# Patient Record
Sex: FEMALE | Race: OTHER | Employment: OTHER | URBAN - METROPOLITAN AREA
[De-identification: names, ages, dates, MRNs, and addresses within clinical notes are randomized per-mention and may not be internally consistent; named-entity substitution may affect disease eponyms.]

---

## 2019-04-04 LAB
ANTIBODY SCREEN, EXTERNAL: NEGATIVE
HBSAG, EXTERNAL: NEGATIVE
HCT, EXTERNAL: 37.1
HGB, EXTERNAL: 11.7
HIV, EXTERNAL: NEGATIVE
RPR, EXTERNAL: NONREACTIVE
RUBELLA, EXTERNAL: NORMAL
TYPE, ABO & RH, EXTERNAL: NORMAL

## 2019-04-11 LAB
CHLAMYDIA, EXTERNAL: NEGATIVE
N. GONORRHEA, EXTERNAL: NEGATIVE

## 2019-08-30 LAB — GTT, 1 HR, GLUCOLA, EXTERNAL: 118

## 2019-10-17 LAB — GRBS, EXTERNAL: POSITIVE

## 2019-10-24 ENCOUNTER — HOSPITAL ENCOUNTER (INPATIENT)
Age: 24
LOS: 2 days | Discharge: HOME | End: 2019-10-26
Attending: OBSTETRICS & GYNECOLOGY | Admitting: OBSTETRICS & GYNECOLOGY
Payer: OTHER GOVERNMENT

## 2019-10-24 ENCOUNTER — ANESTHESIA EVENT (OUTPATIENT)
Dept: LABOR AND DELIVERY | Age: 24
End: 2019-10-24
Payer: OTHER GOVERNMENT

## 2019-10-24 ENCOUNTER — ANESTHESIA (OUTPATIENT)
Dept: LABOR AND DELIVERY | Age: 24
End: 2019-10-24
Payer: OTHER GOVERNMENT

## 2019-10-24 PROBLEM — Z34.90 PREGNANCY: Status: ACTIVE | Noted: 2019-10-24

## 2019-10-24 LAB
ERYTHROCYTE [DISTWIDTH] IN BLOOD BY AUTOMATED COUNT: 16.9 % (ref 11.5–14.5)
HCT VFR BLD AUTO: 37.5 % (ref 35–47)
HGB BLD-MCNC: 11.8 G/DL (ref 11.5–16)
MCH RBC QN AUTO: 25.9 PG (ref 26–34)
MCHC RBC AUTO-ENTMCNC: 31.5 G/DL (ref 30–36.5)
MCV RBC AUTO: 82.4 FL (ref 80–99)
NRBC # BLD: 0 K/UL (ref 0–0.01)
NRBC BLD-RTO: 0 PER 100 WBC
PLATELET # BLD AUTO: 280 K/UL (ref 150–400)
PMV BLD AUTO: 9.8 FL (ref 8.9–12.9)
RBC # BLD AUTO: 4.55 M/UL (ref 3.8–5.2)
WBC # BLD AUTO: 11.8 K/UL (ref 3.6–11)

## 2019-10-24 PROCEDURE — 75410000003 HC RECOV DEL/VAG/CSECN EA 0.5 HR: Performed by: OBSTETRICS & GYNECOLOGY

## 2019-10-24 PROCEDURE — 75410000000 HC DELIVERY VAGINAL/SINGLE: Performed by: OBSTETRICS & GYNECOLOGY

## 2019-10-24 PROCEDURE — 10907ZC DRAINAGE OF AMNIOTIC FLUID, THERAPEUTIC FROM PRODUCTS OF CONCEPTION, VIA NATURAL OR ARTIFICIAL OPENING: ICD-10-PCS | Performed by: OBSTETRICS & GYNECOLOGY

## 2019-10-24 PROCEDURE — 74011000250 HC RX REV CODE- 250: Performed by: STUDENT IN AN ORGANIZED HEALTH CARE EDUCATION/TRAINING PROGRAM

## 2019-10-24 PROCEDURE — 75410000002 HC LABOR FEE PER 1 HR: Performed by: OBSTETRICS & GYNECOLOGY

## 2019-10-24 PROCEDURE — 36415 COLL VENOUS BLD VENIPUNCTURE: CPT

## 2019-10-24 PROCEDURE — 76060000078 HC EPIDURAL ANESTHESIA: Performed by: STUDENT IN AN ORGANIZED HEALTH CARE EDUCATION/TRAINING PROGRAM

## 2019-10-24 PROCEDURE — 74011000258 HC RX REV CODE- 258: Performed by: OBSTETRICS & GYNECOLOGY

## 2019-10-24 PROCEDURE — 77030011943

## 2019-10-24 PROCEDURE — 85027 COMPLETE CBC AUTOMATED: CPT

## 2019-10-24 PROCEDURE — 0HQ9XZZ REPAIR PERINEUM SKIN, EXTERNAL APPROACH: ICD-10-PCS | Performed by: OBSTETRICS & GYNECOLOGY

## 2019-10-24 PROCEDURE — 74011250636 HC RX REV CODE- 250/636: Performed by: STUDENT IN AN ORGANIZED HEALTH CARE EDUCATION/TRAINING PROGRAM

## 2019-10-24 PROCEDURE — 74011250637 HC RX REV CODE- 250/637: Performed by: OBSTETRICS & GYNECOLOGY

## 2019-10-24 PROCEDURE — 65270000029 HC RM PRIVATE

## 2019-10-24 PROCEDURE — 4A1HXCZ MONITORING OF PRODUCTS OF CONCEPTION, CARDIAC RATE, EXTERNAL APPROACH: ICD-10-PCS | Performed by: OBSTETRICS & GYNECOLOGY

## 2019-10-24 PROCEDURE — 74011250636 HC RX REV CODE- 250/636: Performed by: OBSTETRICS & GYNECOLOGY

## 2019-10-24 PROCEDURE — 00HU33Z INSERTION OF INFUSION DEVICE INTO SPINAL CANAL, PERCUTANEOUS APPROACH: ICD-10-PCS | Performed by: STUDENT IN AN ORGANIZED HEALTH CARE EDUCATION/TRAINING PROGRAM

## 2019-10-24 RX ORDER — OXYTOCIN/0.9 % SODIUM CHLORIDE 20/1000 ML
125-500 PLASTIC BAG, INJECTION (ML) INTRAVENOUS ONCE
Status: COMPLETED | OUTPATIENT
Start: 2019-10-24 | End: 2019-10-24

## 2019-10-24 RX ORDER — DIPHENHYDRAMINE HCL 25 MG
25 CAPSULE ORAL
Status: DISCONTINUED | OUTPATIENT
Start: 2019-10-24 | End: 2019-10-26 | Stop reason: HOSPADM

## 2019-10-24 RX ORDER — DOCUSATE SODIUM 100 MG/1
100 CAPSULE, LIQUID FILLED ORAL
Status: DISCONTINUED | OUTPATIENT
Start: 2019-10-24 | End: 2019-10-26 | Stop reason: HOSPADM

## 2019-10-24 RX ORDER — IBUPROFEN 800 MG/1
800 TABLET ORAL EVERY 8 HOURS
Status: DISCONTINUED | OUTPATIENT
Start: 2019-10-24 | End: 2019-10-26 | Stop reason: HOSPADM

## 2019-10-24 RX ORDER — OXYTOCIN/0.9 % SODIUM CHLORIDE 30/500 ML
0-20 PLASTIC BAG, INJECTION (ML) INTRAVENOUS
Status: DISCONTINUED | OUTPATIENT
Start: 2019-10-24 | End: 2019-10-24

## 2019-10-24 RX ORDER — ONDANSETRON 2 MG/ML
4 INJECTION INTRAMUSCULAR; INTRAVENOUS
Status: DISCONTINUED | OUTPATIENT
Start: 2019-10-24 | End: 2019-10-24

## 2019-10-24 RX ORDER — SODIUM CHLORIDE 0.9 % (FLUSH) 0.9 %
5-40 SYRINGE (ML) INJECTION EVERY 8 HOURS
Status: DISCONTINUED | OUTPATIENT
Start: 2019-10-24 | End: 2019-10-25

## 2019-10-24 RX ORDER — NALOXONE HYDROCHLORIDE 0.4 MG/ML
0.4 INJECTION, SOLUTION INTRAMUSCULAR; INTRAVENOUS; SUBCUTANEOUS AS NEEDED
Status: DISCONTINUED | OUTPATIENT
Start: 2019-10-24 | End: 2019-10-26 | Stop reason: HOSPADM

## 2019-10-24 RX ORDER — HYDROMORPHONE HYDROCHLORIDE 2 MG/ML
1 INJECTION, SOLUTION INTRAMUSCULAR; INTRAVENOUS; SUBCUTANEOUS
Status: DISCONTINUED | OUTPATIENT
Start: 2019-10-24 | End: 2019-10-26 | Stop reason: HOSPADM

## 2019-10-24 RX ORDER — OXYTOCIN/0.9 % SODIUM CHLORIDE 30/500 ML
500 PLASTIC BAG, INJECTION (ML) INTRAVENOUS
Status: DISCONTINUED | OUTPATIENT
Start: 2019-10-24 | End: 2019-10-24

## 2019-10-24 RX ORDER — HYDROCORTISONE ACETATE PRAMOXINE HCL 2.5; 1 G/100G; G/100G
CREAM TOPICAL AS NEEDED
Status: DISCONTINUED | OUTPATIENT
Start: 2019-10-24 | End: 2019-10-26 | Stop reason: HOSPADM

## 2019-10-24 RX ORDER — SODIUM CHLORIDE 0.9 % (FLUSH) 0.9 %
5-40 SYRINGE (ML) INJECTION AS NEEDED
Status: DISCONTINUED | OUTPATIENT
Start: 2019-10-24 | End: 2019-10-26 | Stop reason: HOSPADM

## 2019-10-24 RX ORDER — FENTANYL/BUPIVACAINE/NS/PF 2-1250MCG
1-16 PREFILLED PUMP RESERVOIR EPIDURAL CONTINUOUS
Status: DISCONTINUED | OUTPATIENT
Start: 2019-10-24 | End: 2019-10-24

## 2019-10-24 RX ORDER — NALOXONE HYDROCHLORIDE 0.4 MG/ML
0.4 INJECTION, SOLUTION INTRAMUSCULAR; INTRAVENOUS; SUBCUTANEOUS ONCE
Status: DISCONTINUED | OUTPATIENT
Start: 2019-10-24 | End: 2019-10-24 | Stop reason: HOSPADM

## 2019-10-24 RX ORDER — SIMETHICONE 80 MG
80 TABLET,CHEWABLE ORAL
Status: DISCONTINUED | OUTPATIENT
Start: 2019-10-24 | End: 2019-10-26 | Stop reason: HOSPADM

## 2019-10-24 RX ORDER — EPHEDRINE SULFATE/0.9% NACL/PF 50 MG/5 ML
10 SYRINGE (ML) INTRAVENOUS
Status: DISCONTINUED | OUTPATIENT
Start: 2019-10-24 | End: 2019-10-24 | Stop reason: HOSPADM

## 2019-10-24 RX ORDER — SWAB
1 SWAB, NON-MEDICATED MISCELLANEOUS DAILY
Status: DISCONTINUED | OUTPATIENT
Start: 2019-10-25 | End: 2019-10-26 | Stop reason: HOSPADM

## 2019-10-24 RX ORDER — DOCUSATE SODIUM 100 MG/1
100 CAPSULE, LIQUID FILLED ORAL 2 TIMES DAILY
COMMUNITY

## 2019-10-24 RX ORDER — LIDOCAINE HYDROCHLORIDE AND EPINEPHRINE 15; 5 MG/ML; UG/ML
INJECTION, SOLUTION EPIDURAL
Status: SHIPPED | OUTPATIENT
Start: 2019-10-24 | End: 2019-10-24

## 2019-10-24 RX ORDER — OXYCODONE AND ACETAMINOPHEN 5; 325 MG/1; MG/1
1 TABLET ORAL
Status: DISCONTINUED | OUTPATIENT
Start: 2019-10-24 | End: 2019-10-26 | Stop reason: HOSPADM

## 2019-10-24 RX ORDER — EPHEDRINE SULFATE/0.9% NACL/PF 50 MG/5 ML
10 SYRINGE (ML) INTRAVENOUS
Status: COMPLETED | OUTPATIENT
Start: 2019-10-24 | End: 2019-10-24

## 2019-10-24 RX ORDER — NALOXONE HYDROCHLORIDE 0.4 MG/ML
0.4 INJECTION, SOLUTION INTRAMUSCULAR; INTRAVENOUS; SUBCUTANEOUS AS NEEDED
Status: DISCONTINUED | OUTPATIENT
Start: 2019-10-24 | End: 2019-10-24 | Stop reason: HOSPADM

## 2019-10-24 RX ORDER — LANOLIN ALCOHOL/MO/W.PET/CERES
CREAM (GRAM) TOPICAL 2 TIMES DAILY
COMMUNITY
End: 2019-10-26

## 2019-10-24 RX ORDER — SODIUM CHLORIDE, SODIUM LACTATE, POTASSIUM CHLORIDE, CALCIUM CHLORIDE 600; 310; 30; 20 MG/100ML; MG/100ML; MG/100ML; MG/100ML
125 INJECTION, SOLUTION INTRAVENOUS CONTINUOUS
Status: DISCONTINUED | OUTPATIENT
Start: 2019-10-24 | End: 2019-10-26 | Stop reason: HOSPADM

## 2019-10-24 RX ORDER — NALOXONE HYDROCHLORIDE 0.4 MG/ML
0.4 INJECTION, SOLUTION INTRAMUSCULAR; INTRAVENOUS; SUBCUTANEOUS ONCE
Status: DISCONTINUED | OUTPATIENT
Start: 2019-10-24 | End: 2019-10-24

## 2019-10-24 RX ORDER — ONDANSETRON 2 MG/ML
4 INJECTION INTRAMUSCULAR; INTRAVENOUS
Status: DISCONTINUED | OUTPATIENT
Start: 2019-10-24 | End: 2019-10-26 | Stop reason: HOSPADM

## 2019-10-24 RX ORDER — CALCIUM CARBONATE 200(500)MG
1 TABLET,CHEWABLE ORAL DAILY
COMMUNITY

## 2019-10-24 RX ORDER — HYDROMORPHONE HYDROCHLORIDE 2 MG/ML
1 INJECTION, SOLUTION INTRAMUSCULAR; INTRAVENOUS; SUBCUTANEOUS
Status: DISCONTINUED | OUTPATIENT
Start: 2019-10-24 | End: 2019-10-24 | Stop reason: HOSPADM

## 2019-10-24 RX ORDER — FENTANYL/BUPIVACAINE/NS/PF 2-1250MCG
1-16 PREFILLED PUMP RESERVOIR EPIDURAL CONTINUOUS
Status: DISCONTINUED | OUTPATIENT
Start: 2019-10-24 | End: 2019-10-24 | Stop reason: HOSPADM

## 2019-10-24 RX ADMIN — SODIUM CHLORIDE, SODIUM LACTATE, POTASSIUM CHLORIDE, AND CALCIUM CHLORIDE 125 ML/HR: 600; 310; 30; 20 INJECTION, SOLUTION INTRAVENOUS at 12:52

## 2019-10-24 RX ADMIN — IBUPROFEN 800 MG: 800 TABLET ORAL at 18:39

## 2019-10-24 RX ADMIN — SODIUM CHLORIDE 5 MILLION UNITS: 900 INJECTION, SOLUTION INTRAVENOUS at 12:00

## 2019-10-24 RX ADMIN — OXYTOCIN-SODIUM CHLORIDE 0.9% IV SOLN 30 UNIT/500ML 999 MILLI-UNITS/MIN: 30-0.9/5 SOLUTION at 16:55

## 2019-10-24 RX ADMIN — Medication 10 MG: at 13:52

## 2019-10-24 RX ADMIN — SODIUM CHLORIDE, SODIUM LACTATE, POTASSIUM CHLORIDE, AND CALCIUM CHLORIDE 1000 ML: 600; 310; 30; 20 INJECTION, SOLUTION INTRAVENOUS at 11:35

## 2019-10-24 RX ADMIN — PENICILLIN G POTASSIUM 2.5 MILLION UNITS: 20000000 POWDER, FOR SOLUTION INTRAVENOUS at 15:31

## 2019-10-24 RX ADMIN — LIDOCAINE HYDROCHLORIDE AND EPINEPHRINE 4.5 ML: 15; 5 INJECTION, SOLUTION EPIDURAL at 12:54

## 2019-10-24 RX ADMIN — Medication 5000 MILLI-UNITS/HR: at 18:44

## 2019-10-24 RX ADMIN — OXYTOCIN-SODIUM CHLORIDE 0.9% IV SOLN 30 UNIT/500ML 2 MILLI-UNITS/MIN: 30-0.9/5 SOLUTION at 15:58

## 2019-10-24 RX ADMIN — Medication 12 ML/HR: at 12:58

## 2019-10-24 NOTE — H&P
History & Physical    Name: Ileana Crowell MRN: [id number]  SSN: [id number]    YOB: 1995  Age: 30 y.o. Sex: female        Subjective:     Estimated Date of Delivery: 19  OB History        2    Para   1    Term   1            AB        Living           SAB        TAB        Ectopic        Molar        Multiple        Live Births                    Ms. Juliette Porter is admitted with pregnancy at 37w6d for Increasingly painful contractions. Prenatal course was normal. Please see prenatal records for details. Past Medical History:   Diagnosis Date    Anemia     Heart abnormality     tachycardia with pregnancy     History reviewed. No pertinent surgical history. Social History     Occupational History    Not on file   Tobacco Use    Smoking status: Never Smoker    Smokeless tobacco: Never Used   Substance and Sexual Activity    Alcohol use: Never     Frequency: Never    Drug use: Never    Sexual activity: Yes     History reviewed. No pertinent family history. No Known Allergies  Prior to Admission medications    Medication Sig Start Date End Date Taking? Authorizing Provider   PNV66-Iron Fumarate-FA-DSS-DHA 26-1.2- mg cap Take  by mouth. Yes Provider, Historical   ferrous sulfate (IRON) 325 mg (65 mg iron) tablet Take  by mouth two (2) times a day. Yes Provider, Historical   docusate sodium (COLACE) 100 mg capsule Take 100 mg by mouth two (2) times a day. Yes Provider, Historical   calcium carbonate (TUMS) 200 mg calcium (500 mg) chew Take 1 Tab by mouth daily. Yes Provider, Historical        Review of Systems: A comprehensive review of systems was negative except for that written in the HPI.     Objective:     Vitals:  Vitals:    10/24/19 1126 10/24/19 1140   BP: 115/81    Pulse: (!) 114    Resp: 16    Temp: 98.5 °F (36.9 °C)    Weight:  80.3 kg (177 lb)   Height:  5' 5\" (1.651 m)        Physical Exam:  General NAD  Abdm: gravid NT  Back: CHRYSTAL CVA NT, spine NT  CHRYSTAL LE NT w/o edema  [place]: q 2 min  Membranes:  Intact  Fetal Heart Rate: cat 1  SVE: 4/90/0 in office  EFW: 7 lb      Prenatal Labs:   No results found for: RUBELLAEXT, GRBSEXT, HBSAGEXT, HIVEXT, RPREXT, GONNOEXT, CHLAMEXT     Assessment/Plan:     Plan: Admit for Reassuring fetal status, Labor  Progressing normally, Continue plan for vaginal delivery. Group B Strep was positive, will treat prophylactically with penicillin.     Signed By:  Mansi Brady,      October 24, 2019

## 2019-10-24 NOTE — ANESTHESIA PREPROCEDURE EVALUATION
Relevant Problems   No relevant active problems       Anesthetic History   No history of anesthetic complications            Review of Systems / Medical History  Patient summary reviewed, nursing notes reviewed and pertinent labs reviewed    Pulmonary  Within defined limits            Pertinent negatives: No asthma, shortness of breath and recent URI     Neuro/Psych   Within defined limits           Cardiovascular  Within defined limits                Exercise tolerance: >4 METS     GI/Hepatic/Renal     GERD: well controlled           Endo/Other  Within defined limits           Other Findings              Physical Exam    Airway  Mallampati: III  TM Distance: 4 - 6 cm  Neck ROM: normal range of motion   Mouth opening: Normal     Cardiovascular    Rhythm: regular  Rate: normal         Dental    Dentition: Lower dentition intact and Upper dentition intact     Pulmonary  Breath sounds clear to auscultation               Abdominal         Other Findings            Anesthetic Plan    ASA: 2  Anesthesia type: epidural            Anesthetic plan and risks discussed with: Patient

## 2019-10-24 NOTE — DISCHARGE SUMMARY
OBSTETRIC DISCHARGE SUMMARY    Patient ID:  Sarah Yu  [id number]  1 y.o.  1995    Admit Date: 10/24/2019    Discharge Date: oct 26    Admitting Physician: Luis Angel Barakat DO  Attending Physician: Abdi De Anda DO    Admission Diagnoses: Pregnancy [Z34.90]    Discharge Diagnoses: Same, delivered  Information for the patient's :  Miranda Payer Female Adali [[id number]]             Additional Diagnoses: none. Principle Procedure:     Additional procedures:  none    [id number]       Maternal Labs:   Lab Results   Component Value Date/Time    HBsAg, External negative 2019    HIV, External negative 2019    Rubella, External non-immune 2019    RPR, External nonreactive 2019    GrBStrep, External positive 10/17/2019           History of Present Illness:   OB History        2    Para   1    Term   1            AB        Living           SAB        TAB        Ectopic        Molar        Multiple        Live Births                  Admitted for active labor.      Hospital Course:   Routine and uncomplicated        Signed:  Luis Angel Barakat DO  10/24/2019  5:02 PM

## 2019-10-24 NOTE — DISCHARGE INSTRUCTIONS
Discharge Instructions for Vaginal Delivery    Patient ID:  [facility]  [id number]  7 y.o.  1995    Take Home Medications       Continue taking your prenatal vitamins if you are breastfeeding. Follow-up care is a key part of your treatment and safety. Please schedule and keep appointments. Follow-up with your primary OB in 4-6 weeks. Activity  Avoid anything in your vagina for 6 weeks (no intercourse, tampons, or douching). You may drive unless you are taking prescription pain medications. Climbing stairs and light lifting are okay. Please avoid excessive exercise, though walking is okay- you'll be tired! Diet  Regular diet as tolerated. Be sure to drink plenty of fluids if you are breastfeeding. Wound care  If you have stitches, continue to rinse with a squirt bottle of warm water each time you void for about 7-10 days. .  Your stitches will gradually dissolve over four to eight weeks. Sitz baths are also helpful to keep the wound clean, encourage healing, and to help with pain associated with the stitches or hemorrhoids. You can use either a sitz bath basin or a bathtub filled with 2-3\" inches of plain warm water. Soak for 10 minutes 3 times a day as tolerated. Pain Management  1. Over the counter medications such as Tylenol and ibuprofen (Motrin or Advil) are ideal.  These may be taken together, alternating doses. You may  take the maximum dose:  Motrin or Advil (generic ibuprofen), either 3 tablets every 6 hours or 4 tablets every 8 hours or Tylenol (acetominophen) 1000mg every 6 hours (equivalent to 2 extra strength Tylenol). 2. You may also have a precrescription for stronger pain medication. Take only as needed and transition to over the counter medication in the next few days. Minimize amounts of the prescription medication, as it can be habit-forming and will worsen or cause constipation.  Most patients will find that within a couple of days, their pain is adequately controlled using only over-the-counter medications. 3. The prescription pain medication is mixed with Tylenol, therefore, you should not take any extra Tylenol or acetaminophen until you have reduced your prescription pain medication. 4. Add heating pad or sitz baths as needed. Add hemorrhoid wipes or ointments if needed    Constipation  1. Constipation is normal after pregnancy and delivery, especially while taking prescription narcotic pain medication. 2. Over the counter remedies including ducosate (Colace), take 1-2 capsules 1-2 times daily for soft stool as needed. You may also add/ try milk of magnesia or rectal remedies such as Dulcolax or Fleets enema. Recovery: What to Expect at Home  1. Fatigue is expected. Try to rest when you can and don't worry about doing housework or other tasks which can wait. 2. The soreness along your bottom will improve significantly over the first 2 weeks, but it may take 6 weeks before you are completely recovered. 3. Back pain or general body aches or muscle soreness are expected and should improve with acetominophen or ibuprofen. 4. Leg swelling due to pregnancy and/or IV fluids given in the hospital will take about two weeks to resolve. 5. Most women experience some form of the \"Baby Blues\" after having a baby. Feeling emotional, tearful, frustrated, anxious, sad, and irritable some of the time is normal and go away after about 2 weeks. Adequate rest and help from your family will help. Take breaks from caring for the baby. Call your doctor if your symptoms seem severe, last more than 2 weeks, or seem to be getting worse instead of better. Get help immediately if you have thoughts of wanting to hurt yourself or others! Call your doctor or seek immediate medical care if you have:  Heavy vaginal bleeding, soaking through one or more pads an hour for several hours. Foul-smelling discharge from your vagina or incision.   Consistent nausea and vomiting and cannot keep fluids down. Consistent pain that does not get better after you take pain medicine.   Sudden chest pain and shortness of breath  Signs of a blood clot: pain/ swelling/ increasing redness in your lower extremeties  Signs of infection: increased pain in your abdomen or vaginal area; red streaks, warmth, or tenderness of your breasts; fever of 100.5 F or greater

## 2019-10-24 NOTE — PROGRESS NOTES
1125 Patient on L&D for labor from [facility]. TORB from Dr. Valeria Solorio for admission order set including penicillin and patient may get epidural. Patient placed on EFM. Assessment completed. Patient and family oriented to room and unit including call bell and emergency cord. 1148 Patient turned to L side and given [employer]. [id number] [employer] Dr. Valeria Solorio at bedside. MD reviewing EFM tracing. 1223 Patient in bathroom. Feeling urge to have BM, Instructed not to push. SVE 6 cm    1227 Dr. Belen Garcia called for epidural placement. 1250 Dr. Belen Garcia leaving room after epidural placement. 1309 Patient reports still feeling contraction pain despite position change and epidural bolus button. Dr. Belen Garcia called to bedside to evaluate. 0 Dr. Belen Garcia at bedside assessing dermatome level. Patient more numb on R side than left, but still has pain from contractions. [address]. Belen Garcia administering lidocaine in epidural.     1335 Per patient, Dr. Belen Garcia returned to bedside and administered more lidocaine in epidural.     1342 Dr. Valeria Solorio called and updated on patient. Per      [phone] Patient reports feeling lightheaded . BP at patient's baseline. IV fluid bolus started. 25 [facility] Dr. Belen Garcia called. TORB to give 10mg ephedrine now. 1400 Reports feeling better after ephedrine. 1400 [facility] Dr. Amanda Frost at bedside. MD aware of vital signs. SVE 8/100/0. AROM at this time. Moderate amount of clear amniotic fluid noted on chucks. Pads changed. Patient repositioned to high fowlers. 1512 Straight cath at this time. Pads changed. Patient back in high fowlers. Family at bedside. Instructed to call this RN when feeling urge to push/have BM.     1520 Patient reporting feeling pressure with contractions. Patient instructed to call this RN when pressure remains constant. 26 Dr. Valeria Solorio at bedside for SVE, 9/100/0. VORB to start pitocin. 525 [facility] Dr. Valeria Solorio called for delivery. 26 Dr. Valeria Solorio at bedside for delivery.      [phone] Patient bearing down with each contraction. 164 [place] head out.  of viable female infant. Nursery RN drying and stimulating crying baby. Dr. Mark Stahl at bedside for delivery of placenta and vaginal repair. [address]. Mark Stahl performing straight cath at this time for 75mL clear, yellow urine. 165 Placenta expressed at this time. Jerry Suarez for placenta bolus now. 1700 Dr. Mark Stahl leaving bedside at this time. MD aware of QBL 30mL. 184 Patient on bedpan, attempting to void. 1850 Patient able to void 160mL on bedpan. Assisted with pericare. 191 Bedside and Verbal shift change report given to MCKAYLA Gould RN (oncoming nurse) by this RN (offgoing nurse). Report included the following information SBAR, Kardex, Intake/Output, MAR and Recent Results.

## 2019-10-24 NOTE — ANESTHESIA PROCEDURE NOTES
Epidural Block    Start time: 10/24/2019 12:38 PM  End time: 10/24/2019 12:45 PM  Performed by: Benny Le DO  Authorized by: Benny Le DO     Pre-Procedure  Indication: labor epidural    Preanesthetic Checklist: patient identified, risks and benefits discussed, anesthesia consent, patient being monitored, timeout performed and anesthesia consent      Epidural:   Patient position:  Seated  Prep region:  Lumbar  Prep: Patient draped and Chlorhexidine    Location:  L2-3    Needle and Epidural Catheter:   Needle Type:  Tuohy  Needle Gauge:  17 G  Injection Technique:  Loss of resistance using saline  Attempts:  1  Catheter Size:  20 G  Catheter at Skin Depth (cm):  6  Depth in Epidural Space (cm):  4  Events: no blood with aspiration, no cerebrospinal fluid with aspiration, no paresthesia and negative aspiration test    Test Dose:  Negative    Assessment:   Catheter Secured:  Tegaderm and tape  Insertion:  Uncomplicated  Patient tolerance:  Patient tolerated the procedure well with no immediate complications

## 2019-10-24 NOTE — PROGRESS NOTES
Labor Progress Note  Patient seen, fetal heart rate and contraction pattern evaluated, patient examined.  Comfortable with epidural.    Patient Vitals for the past 2 hrs:   BP Pulse SpO2   10/24/19 1410 122/72 (!) 110    10/24/19 1407 123/68 (!) 120    10/24/19 1406 91/55 (!) 130    10/24/19 1405   100 %   10/24/19 1404 104/66 (!) 133    10/24/19 1401 128/72 (!) 116    10/24/19 1400   100 %   10/24/19 1358 135/73 (!) 114    10/24/19 1355 139/76 (!) 134 100 %   10/24/19 1352 114/64 (!) 114    10/24/19 1350 (!) 86/53 (!) 131 100 %   10/24/19 1348 110/63 (!) 120    10/24/19 1347 120/65 (!) 116    10/24/19 1345   100 %   10/24/19 1340   100 %   10/24/19 1338 113/63 (!) 105    10/24/19 1335   100 %   10/24/19 1334 119/61 (!) 116    10/24/19 1332 117/69 (!) 120    10/24/19 1330   99 %   10/24/19 1328 127/63 (!) 102    10/24/19 1325 113/65 (!) 104 99 %   10/24/19 1322 115/70 (!) 104    10/24/19 1320 106/69 (!) 105 100 %   10/24/19 1317 118/74 97    10/24/19 1315   100 %   10/24/19 1313 113/70 94    10/24/19 1310  98 100 %   10/24/19 1309 109/65     10/24/19 1306 108/75 96    10/24/19 1305   97 %   10/24/19 1303 110/67 91    10/24/19 1300 116/65 (!) 105 100 %   10/24/19 1257 116/66 96    10/24/19 1255  (!) 110 100 %   10/24/19 1254 109/62     10/24/19 1251 118/73 (!) 101    10/24/19 1250   100 %   10/24/19 1248 120/64 (!) 102    10/24/19 1245 109/61 100 100 %   10/24/19 1241 132/62 (!) 102    10/24/19 1240   100 %   10/24/19 1235   100 %   10/24/19 1234 141/90 (!) 101        Physical Exam:  Cervical Exam:  8/100/0 AROM clear fluid  Uterine Activity: q 3 min  Fetal Heart Rate: cat 1    Assessment/Plan: IUP at 37w6d, labor    Reassuring fetal status, Continue plan for vaginal delivery  GBS pos: YASIR Perez DO, [address] [facility],[address] [facility]

## 2019-10-24 NOTE — PROGRESS NOTES
Labor Progress Note  Patient seen, fetal heart rate and contraction pattern evaluated, patient examined.   Patient Vitals for the past 2 hrs:   BP Pulse SpO2   10/24/19 1540   100 %   10/24/19 1535   100 %   10/24/19 1531 101/71 (!) 116    10/24/19 1530   100 %   10/24/19 1525   100 %   10/24/19 1520   100 %   10/24/19 1515   100 %   10/24/19 1513 119/70 (!) 102    10/24/19 1510 120/77 (!) 112 99 %   10/24/19 1507 117/70 (!) 114    10/24/19 1505   100 %   10/24/19 1504 109/67 (!) 110    10/24/19 1501 122/73 (!) 107    10/24/19 1500   100 %   10/24/19 1458 118/70 (!) 112    10/24/19 1455 117/69 (!) 122 100 %   10/24/19 1452 113/68 (!) 112    10/24/19 1450   94 %   10/24/19 1449 114/67 (!) 110 91 %   10/24/19 1446 108/70 (!) 109    10/24/19 1445   99 %   10/24/19 1443 110/70 (!) 118    10/24/19 1440 111/58 (!) 120 99 %   10/24/19 1437 113/67 (!) 128    10/24/19 1435   100 %   10/24/19 1434 104/64 (!) 122    10/24/19 1431 105/74 (!) 110    10/24/19 1430   100 %   10/24/19 1428 125/63 (!) 120    10/24/19 1425 122/67 (!) 118 100 %   10/24/19 1423 134/65 (!) 128    10/24/19 1420   100 %   10/24/19 1419 107/61 (!) 130    10/24/19 1416 119/68 (!) 126    10/24/19 1415   100 %   10/24/19 1413 108/70 (!) 153    10/24/19 1410 122/72 (!) 110 100 %   10/24/19 1407 123/68 (!) 120    10/24/19 1406 91/55 (!) 130    10/24/19 1405   100 %   10/24/19 1404 104/66 (!) 133    10/24/19 1401 128/72 (!) 116    10/24/19 1400   100 %   10/24/19 1358 135/73 (!) 114    10/24/19 1355 139/76 (!) 134 100 %   10/24/19 1352 114/64 (!) 114    10/24/19 1350 (!) 86/53 (!) 131 100 %       Physical Exam:  Cervical Exam:  9/100/0  Uterine Activity: q 3-5 min  Fetal Heart Rate: cat 1    Assessment/Plan:    Reassuring fetal status, Continue plan for vaginal delivery  GBS positive: PCN  Pit aug prn    Kvng Addison DO, [address] [facility],[address] [facility]

## 2019-10-24 NOTE — L&D DELIVERY NOTE
Delivery Note:     Patient reached FD and pushed with good effort to deliver the fetal head in OA position. No nuchal cord found. The anterior shoulder, followed by the posterior shoulder and the rest of the body then delivered easily. This was a GIRL with Apgars of 8 and 9 at 1 and 5 minutes respectively, weight pending. The infant was placed on mom's abdomen. The cord was then double clamped and cut by the FOB. The placenta followed spontaneously, intact, with 3VC. Pitocin was added to the IVF and the fundus was firm to palpation. The vagina, cervix and perineum were examined and bilateral labial lacerations were found and the left repaired to hemostasis with 3-0 vicryl, right hemostatic with pressure. EBL 50 mL. No complications. Mom and baby doing well. Dr. Sandra Roberts delivering. Juarez Campos DO, [address] [place] [address],[address] [facility]    Delivery Summary    Patient: Hien Leiva MRN: [id number]  SSN: [id number]    YOB: 1995  Age: 30 y.o. Sex: female       Information for the patient's :  Ben Erwin, Female Adali [[id number]]       Labor Events:    Labor: No    Steroids: None   Cervical Ripening Date/Time:       Cervical Ripening Type: None   Antibiotics During Labor: Yes   Rupture Identifier: Sac 1    Rupture Date/Time: 10/24/2019 2:14 PM   Rupture Type: AROM   Amniotic Fluid Volume:  Moderate    Amniotic Fluid Description: Clear    Amniotic Fluid Odor: None    Induction: None       Induction Date/Time:        Indications for Induction:      Augmentation: AROM   Augmentation Date/Time: 10/24/18556:14 PM   Indications for Augmentation: Ineffective Contraction Pattern   Labor complications: None       Additional complications:        Delivery Events:  Indications For Episiotomy:     Episiotomy: None   Perineal Laceration(s):     Repaired:     Periurethral Laceration Location:      Repaired:     Labial Laceration Location: bilateral   Repaired: Yes   Sulcal Laceration Location:     Repaired:     Vaginal Laceration Location:     Repaired: Yes   Cervical Laceration Location:     Repaired:     Repair Suture: Vicryl 3-0   Number of Repair Packets:     Estimated Blood Loss (ml):  ml     Delivery Date: 10/24/2019    Delivery Time: 4:42 PM  Delivery Type: Vaginal, Spontaneous  Sex:  Female    Gestational Age: 41w10d   Delivery Clinician:  Julianna Quinn  Living Status: Living   Delivery Location: L&D 209          APGARS  One minute Five minutes Ten minutes   Skin color: 0   1        Heart rate: 2   2        Grimace: 2   2        Muscle tone: 2   2        Breathin   2        Totals: 8   9            Presentation: Vertex    Position: Right Occiput Anterior  Resuscitation Method:  Suctioning-bulb; Tactile Stimulation     Meconium Stained: None      Cord Information: 3 Vessels  Complications: None  Cord around:    Delayed cord clamping? Yes  Cord clamped date/time:10/24/2019  4:43 PM  Disposition of Cord Blood: Discard    Blood Gases Sent?: No    Placenta:  Date/Time: 10/24/2019  4:56 PM  Removal: Expressed      Appearance: Normal;Intact      Measurements:  Birth Weight:        Birth Length:        Head Circumference:        Chest Circumference:       Abdominal Girth: Other Providers:   Linn DRUMMOND;MARY GRACE PEDERSEN;IRA REYES;ALYSA CURRAN;GWENDOLYN BUNN, Obstetrician;Primary Nurse;Primary [place] Nurse;Nursery Nurse;Charge Nurse           Group B Strep:   Lab Results   Component Value Date/Time    GrBStrep, External positive 10/17/2019     Information for the patient's :  Chucho Coy, Female Adali [[id number]]   No results found for: ABORH, PCTABR, PCTDIG, BILI, ABORHEXT, ABORH    No results for input(s): PCO2CB, PO2CB, HCO3I, SO2I, IBD, PTEMPI, SPECTI, PHICB, ISITE, IDEV, IALLEN in the last 72 hours.

## 2019-10-25 PROCEDURE — 65270000029 HC RM PRIVATE

## 2019-10-25 PROCEDURE — 74011250637 HC RX REV CODE- 250/637: Performed by: OBSTETRICS & GYNECOLOGY

## 2019-10-25 RX ADMIN — IBUPROFEN 800 MG: 800 TABLET ORAL at 17:54

## 2019-10-25 RX ADMIN — Medication 1 TABLET: at 09:32

## 2019-10-25 RX ADMIN — IBUPROFEN 800 MG: 800 TABLET ORAL at 09:32

## 2019-10-25 RX ADMIN — MUPIROCIN: 20 OINTMENT TOPICAL at 09:31

## 2019-10-25 RX ADMIN — IBUPROFEN 800 MG: 800 TABLET ORAL at 02:00

## 2019-10-25 NOTE — PROGRESS NOTES
1910  Bedside and Verbal shift change report given to CAESAR Pitts RN (oncoming nurse) by Lawanda Long RN (offgoing nurse). Report included the following information SBAR, Kardex, Procedure Summary, Intake/Output, MAR, Accordion, Recent Results and Med Rec Status. Patient is resting comfortably. PPP infusing. Patient assessment completed. 1920  Patient ambulates to bathroom and voids 175 ml of clear yellow urine. Epidural cath removed. Back care and partial bath. Pads changed. Valerie care. 2030  TRANSFER - OUT REPORT:    Verbal report given to RISSA Tinajero RN(name) on Adali Coy  being transferred to MIU(unit) for routine progression of care       Report consisted of patients Situation, Background, Assessment and   Recommendations(SBAR). Information from the following report(s) SBAR, Kardex, Procedure Summary, Intake/Output, MAR, Accordion, Recent Results and Med Rec Status was reviewed with the receiving nurse. Lines:   Peripheral IV 10/24/19 Anterior;Distal;Right Forearm (Active)   Site Assessment Clean, dry, & intact 10/24/2019  7:40 PM   Phlebitis Assessment 0 10/24/2019  7:40 PM   Infiltration Assessment 0 10/24/2019  7:40 PM   Dressing Status Clean, dry, & intact 10/24/2019  7:40 PM   Dressing Type Tape;Transparent 10/24/2019  7:40 PM   Hub Color/Line Status Pink 10/24/2019  7:40 PM   Alcohol Cap Used Yes 10/24/2019 11:35 AM        Opportunity for questions and clarification was provided.       Patient transported with:   Registered Nurse

## 2019-10-25 NOTE — ROUTINE PROCESS
Bedside and Verbal shift change report given to K. Pasquale Claude (oncoming nurse) by Patel Leon. Mark Mcdaniel RN (offgoing nurse). Report included the following information SBAR, Kardex, Intake/Output, MAR and Recent Results.

## 2019-10-25 NOTE — LACTATION NOTE
This note was copied from a baby's chart. 3351 - Rounding for lactation. Baby in nursery. Mother will call lacation for next feed. 1220 - Rounding for lactation. Mother alseep and asks if she can call lactation for next feed. 18  - Mother feeding infant at left breast, football hold. Latched well, mother comfortable. Audible swallows. Discussed with mother her plan for feeding. Reviewed the benefits of exclusive breast milk feeding during the hospital stay. Informed her of the risks of using formula to supplement in the first few days of life as well as the benefits of successful breast milk feeding; referred her to the Breastfeeding booklet about this information. She acknowledges understanding of information reviewed and states that it is her plan to breastfeed her infant. Will support her choice and offer additional information as needed. Pt will successfully establish breastfeeding by feeding in response to early feeding cues   or wake every 3h, will obtain deep latch, and will keep log of feedings/output. Taught to BF at hunger cues and or q 2-3 hrs and to offer 10-20 drops of hand expressed colostrum at any non-feeds.       Breast Assessment  Left Breast: Medium  Left Nipple: Everted, Intact  Right Breast: Medium  Right Nipple: Everted, Intact  Breast- Feeding Assessment  Attends Breast-Feeding Classes: No  Breast-Feeding Experience: No(did not nurse older daughter)  Breast Trauma/Surgery: No  Type/Quality: Good  Lactation Consultant Visits  Breast-Feedings: Good   Mother/Infant Observation  Mother Observation: Alignment, Breast comfortable, Holds breast, Lets baby end feeding, Recognizes feeding cues  Infant Observation: Audible swallows, Breast tissue moves, Feeding cues, Lips flanged, lower, Lips flanged, upper, Opens mouth  LATCH Documentation  Latch: Grasps breast, tongue down, lips flanged, rhythmic sucking  Audible Swallowing: Spontaneous and intermittent (24 hours old)  Type of Nipple: Everted (after stimulation)  Comfort (Breast/Nipple): Soft/non-tender  Hold (Positioning): No assist from staff, mother able to position/hold infant  LATCH Score: 10

## 2019-10-25 NOTE — ROUTINE PROCESS
SBAR IN Report: Mother Verbal report received from Katy Aase, RN (full name & credentials) on this patient, who is now being transferred from L&D (unit) for routine progression of care. Report consisted of patient's Situation, Background, Assessment and Recommendations (SBAR).  ID bands were compared with the identification form, and verified with the patient and transferring nurse. Information from the SBAR, Kardex, Intake/Output, MAR and Recent Results and the Manolo Report was reviewed with the transferring nurse; opportunity for questions and clarification provided.

## 2019-10-25 NOTE — ROUTINE PROCESS
Bedside and Verbal shift change report given to MELISSA Goncalves RN (oncoming nurse) by K. Pasquale Claude (offgoing nurse). Report included the following information SBAR, Procedure Summary, Intake/Output, MAR, Accordion, Recent Results and Med Rec Status.

## 2019-10-26 VITALS
SYSTOLIC BLOOD PRESSURE: 125 MMHG | DIASTOLIC BLOOD PRESSURE: 68 MMHG | BODY MASS INDEX: 29.49 KG/M2 | HEART RATE: 66 BPM | TEMPERATURE: 98 F | OXYGEN SATURATION: 99 % | RESPIRATION RATE: 18 BRPM | WEIGHT: 177 LBS | HEIGHT: 65 IN

## 2019-10-26 PROCEDURE — 74011250636 HC RX REV CODE- 250/636: Performed by: OBSTETRICS & GYNECOLOGY

## 2019-10-26 PROCEDURE — 90707 MMR VACCINE SC: CPT | Performed by: OBSTETRICS & GYNECOLOGY

## 2019-10-26 PROCEDURE — 74011250637 HC RX REV CODE- 250/637: Performed by: OBSTETRICS & GYNECOLOGY

## 2019-10-26 RX ADMIN — IBUPROFEN 800 MG: 800 TABLET ORAL at 01:31

## 2019-10-26 RX ADMIN — DOCUSATE SODIUM 100 MG: 100 CAPSULE, LIQUID FILLED ORAL at 10:52

## 2019-10-26 RX ADMIN — MEASLES, MUMPS, AND RUBELLA VIRUS VACCINE LIVE 0.5 ML: 1000; 12500; 1000 INJECTION, POWDER, LYOPHILIZED, FOR SUSPENSION SUBCUTANEOUS at 10:53

## 2019-10-26 RX ADMIN — IBUPROFEN 800 MG: 800 TABLET ORAL at 10:52

## 2019-10-26 RX ADMIN — Medication 1 TABLET: at 10:52

## 2019-10-26 NOTE — PROGRESS NOTES
Post-Partum Day Number 2 Progress Note    Patient doing well post-partum without significant complaints. Voiding without difficulty, normal lochia. Vitals:    Patient Vitals for the past 24 hrs:   BP Temp Pulse Resp   10/26/19 0034 117/73 98.1 °F (36.7 °C) 72 16   10/25/19 1419 109/61 98.5 °F (36.9 °C) 81 16     Temp (24hrs), Av.3 °F (36.8 °C), Min:98.1 °F (36.7 °C), Max:98.5 °F (36.9 °C)      Vital signs stable, afebrile. Exam:  Patient without distress. Abdomen soft, fundus firm, nontender               Lower extremities, CHRYSTAL nontender w/o edema    Labs: No results found for this or any previous visit (from the past 24 hour(s)). Assessment and Plan:  PPD 2, stable, routine care   Discharge today-instructions reviewed.   Stable w/ OTC meds

## 2019-10-26 NOTE — LACTATION NOTE
This note was copied from a baby's chart. 36 - Mother for discharge today. Mother states she gave infant formula last night as baby was cluster feeding. Support of decision given and reviewed normal  behaviors and basic milk supply and lactogenesis discussed. Reviewed risks associated with introducing an artificial nipple or pacifier and encouraged mother to wait  until breastfeeding is well established ( AAP guidelines suggest waiting until one month of age). Discussed that using artificial nipples may cause ineffective sucking at breast  in the , decreased breast stimulation/lowered breast milk production and  breastfeeding difficulties. Discharge:  Successful breast feeding session(s) observed. Infant's voids and stools are appropriate for age. Mom verbalizes and demonstrates gained breastfeeding skills. Importance of monitoring feedings and output on first week breastfeeding log reviewed. Aware to follow up with pediatrician within 1-2 days of discharge for pediatric assessment and review. Encouraged to call warm line number for any questions/concerns that may arise. Pt will successfully establish breastfeeding by feeding in response to early feeding cues   or wake every 3h, will obtain deep latch, and will keep log of feedings/output. Taught to BF at hunger cues and or q 2-3 hrs and to offer 10-20 drops of hand expressed colostrum at any non-feeds.       Breast Assessment  Left Breast: Medium  Left Nipple: Everted, Intact  Right Breast: Medium  Right Nipple: Everted, Intact  Breast- Feeding Assessment  Attends Breast-Feeding Classes: No  Breast-Feeding Experience: No  Breast Trauma/Surgery: No  Type/Quality: Good(per mother)  Lactation Consultant Visits  Breast-Feedings: Good   Mother/Infant Observation  Mother Observation: Alignment, Breast comfortable, Holds breast, Lets baby end feeding, Recognizes feeding cues  Infant Observation: Audible swallows, Breast tissue moves, Feeding cues, Lips flanged, lower, Lips flanged, upper, Opens mouth  LATCH Documentation  Latch: Grasps breast, tongue down, lips flanged, rhythmic sucking  Audible Swallowing: Spontaneous and intermittent (24 hours old)  Type of Nipple: Everted (after stimulation)  Comfort (Breast/Nipple): Soft/non-tender  Hold (Positioning): No assist from staff, mother able to position/hold infant  LATCH Score: 10 (LATCH score per mother, as no latch observed).

## 2023-09-14 NOTE — PROGRESS NOTES
Chronic condition.  The patient is currently taking furosemide 20 Mg daily as needed and he also take lisinopril/hydrochlorothiazide.  Blood pressure has been well controlled no significant side effects reported.   Post-Partum Day Number 1 Progress Note    Adali Proctor       Information for the patient's :  Sam Proctor, Female Adali [[id number]]   Vaginal, Spontaneous   Patient doing well without significant complaint. Voiding without difficulty, normal lochia. Vitals:  Visit Vitals  /76 (BP 1 Location: Right arm, BP Patient Position: At rest;Sitting)   Pulse 82   Temp 98.3 °F (36.8 °C)   Resp 18   Ht 5' 5\" (1.651 m)   Wt 80.3 kg (177 lb)   SpO2 99%   Breastfeeding? Unknown   BMI 29.45 kg/m²     Temp (24hrs), Av.4 °F (36.9 °C), Min:97.8 °F (36.6 °C), Max:98.8 °F (37.1 °C)        Exam:   Patient without distress. FF @ U-2 NT                LE NT w/o edema    Labs:     Lab Results   Component Value Date/Time    WBC 11.8 (H) 10/24/2019 12:00 PM    HGB 11.8 10/24/2019 12:00 PM    HCT 37.5 10/24/2019 12:00 PM    PLATELET 158  12:00 PM    Hgb, External 11.7 2019    Hct, External 37.1 2019       Recent Results (from the past 24 hour(s))   CBC W/O DIFF    Collection Time: 10/24/19 12:00 PM   Result Value Ref Range    WBC 11.8 (H) 3.6 - 11.0 K/uL    RBC 4.55 3.80 - 5.20 M/uL    HGB 11.8 11.5 - 16.0 g/dL    HCT 37.5 35.0 - 47.0 %    MCV 82.4 80.0 - 99.0 FL    MCH 25.9 (L) 26.0 - 34.0 PG    MCHC 31.5 30.0 - 36.5 g/dL    RDW 16.9 (H) 11.5 - 14.5 %    PLATELET 287 923 - 053 K/uL    MPV 9.8 8.9 - 12.9 FL    NRBC 0.0 0  WBC    ABSOLUTE NRBC 0.00 0.00 - 0.01 K/uL         Assessment: PPD 1 s/p , stable    Plan:  1. Continue routine postpartum care  2.  S/p flu shot